# Patient Record
(demographics unavailable — no encounter records)

---

## 2025-01-13 NOTE — ASSESSMENT
[FreeTextEntry1] : Reviewed on May 2023  Recent labs.  CMP normal.  LDL cholesterol 138, total cholesterol 202, HDL 44 triglycerides 101 total non-  Reviewed April 29, 2024. Recent preoperative EKG from April 29, 2024 was reviewed. Coronary calcium score reviewed.  Reviewed on January 13, 2025. EKG from today reviewed. Recent labs reviewed hemoglobin A1c 6.1.

## 2025-01-13 NOTE — CARDIOLOGY SUMMARY
[___] : [unfilled] [de-identified] : April 15, 2022.  Normal sinus rhythm May 8, 2023 normal sinus rhythm April 2024.  Normal sinus rhythm January 13, 2025.  Normal sinus rhythm [de-identified] : March 18, 2020.  6 minutes and 21 seconds of Christian protocol 89% predicted maximum heart rate 7 METs of workload.  Nonischemic stress echocardiogram.  EKG portion of the test abnormal. [de-identified] : July 20, 2017 EF 60% minimal mitral regurgitation normal left atrium. [de-identified] : March 2024.  Coronary calcium score 0.  3 mm lung nodule.  Possible fatty liver.

## 2025-01-13 NOTE — DISCUSSION/SUMMARY
[FreeTextEntry1] : 55-year-old female with above medical history active medical problems as noted below  #1 essential hypertension.  Well controlled today.  Recommended continued compliance with medication.  Follow-up blood pressure. Goal less than 130/80.  Lifestyle modification . #2 hyperlipidemia.  10-year ASCVD risk less than 4%.  Though lifetime risk is greater than 30%.Coronary calcium score was 0.  Repeat labs ordered.  Consider statin therapy based on that. #3.  Prediabetes.  Low-carb diet strongly recommended.  Weight reduction #4 obesity.  Lifestyle modifications.  Low carbohydrate diet.  Lower saturated fat and calorie restriction.  Continue aggressive swimming exercises recommended.  Consider pharmacotherapy for management of obesity considering underlying comorbidity conditions.  Reviewed role of GLP-1 agonist.  At present she is hesitant. #5 sleep apnea.  Unable to tolerate CPAP.  Recommended continue with aggressive weight reduction. 6.  3 mm small nodule.  Non-smoker.  Recommended to consider 1 year follow-up.  Repeat noncontrast CT scan ordered. 7.  Hepatic steatosis.  Continue aggressive lifestyle and risk factor modifications.  Follow-up with PMD and management of lipids as discussed above.    Counseling regarding low saturated fat, salt and carbohydrate intake was reviewed. Active lifestyle and regular. Exercise along with weight management is advised. All the above were at length reviewed. Answered all the questions. Thank you very much for this kind referral. Please do not hesitate to give me a call for any question. Part of this transcription was done with voice recognition software and phonetically similar errors are common. I apologize for that. Please do not hesitate to call for any questions due to above.  Sincerely, Poli Navarrete MD,FACC,[facility]  [EKG obtained to assist in diagnosis and management of assessed problem(s)] : EKG obtained to assist in diagnosis and management of assessed problem(s)

## 2025-01-13 NOTE — REASON FOR VISIT
[Symptom and Test Evaluation] : symptom and test evaluation [Hyperlipidemia] : hyperlipidemia [Hypertension] : hypertension [FreeTextEntry3] : Dr. Hartmann [FreeTextEntry1] : 55-year-old female is seen in the office for follow-up consultation to review management of hypertension/dyslipidemia  She has started doing regular daily swimming over the last 6 to 8 months. She has other work-related and social stress.  She has not been eating properly. She has no chest pain, palpitation, PND orthopnea pedal edema She is unable to lose weight She has not been using her CPAP as she could not tolerate using CPAP No recent hospital admission Compliance with medication noted She states her blood pressure at home is always around 130/80 and below.

## 2025-01-13 NOTE — PHYSICAL EXAM
[No Acute Distress] : no acute distress [Obese] : obese [Normal S1, S2] : normal S1, S2 [Murmur] : murmur [Clear Lung Fields] : clear lung fields [Normal Gait] : normal gait [No Edema] : no edema [Normal Radial B/L] : normal radial B/L

## 2025-01-13 NOTE — CARDIOLOGY SUMMARY
[___] : [unfilled] [de-identified] : April 15, 2022.  Normal sinus rhythm May 8, 2023 normal sinus rhythm April 2024.  Normal sinus rhythm January 13, 2025.  Normal sinus rhythm [de-identified] : March 18, 2020.  6 minutes and 21 seconds of Christian protocol 89% predicted maximum heart rate 7 METs of workload.  Nonischemic stress echocardiogram.  EKG portion of the test abnormal. [de-identified] : July 20, 2017 EF 60% minimal mitral regurgitation normal left atrium. [de-identified] : March 2024.  Coronary calcium score 0.  3 mm lung nodule.  Possible fatty liver.

## 2025-01-13 NOTE — HISTORY OF PRESENT ILLNESS
[FreeTextEntry1] : Medical history includes\par  1.  Essential hypertension no CHF no CKD.  Non-smoker\par  2.  Sleep apnea not using CPAP\par  3.  Obesity\par  4.  Hyperlipidemia diet controlled\par  5.  Anxiety